# Patient Record
Sex: MALE | Race: WHITE | NOT HISPANIC OR LATINO
[De-identification: names, ages, dates, MRNs, and addresses within clinical notes are randomized per-mention and may not be internally consistent; named-entity substitution may affect disease eponyms.]

---

## 2023-01-04 ENCOUNTER — APPOINTMENT (OUTPATIENT)
Dept: ORTHOPEDIC SURGERY | Facility: CLINIC | Age: 48
End: 2023-01-04
Payer: COMMERCIAL

## 2023-01-04 ENCOUNTER — TRANSCRIPTION ENCOUNTER (OUTPATIENT)
Age: 48
End: 2023-01-04

## 2023-01-04 VITALS — WEIGHT: 189 LBS | BODY MASS INDEX: 28.64 KG/M2 | HEIGHT: 68 IN

## 2023-01-04 DIAGNOSIS — E11.9 TYPE 2 DIABETES MELLITUS W/OUT COMPLICATIONS: ICD-10-CM

## 2023-01-04 PROCEDURE — 99214 OFFICE O/P EST MOD 30 MIN: CPT | Mod: 25,57

## 2023-01-04 PROCEDURE — 73030 X-RAY EXAM OF SHOULDER: CPT | Mod: RT

## 2023-01-04 PROCEDURE — 20611 DRAIN/INJ JOINT/BURSA W/US: CPT | Mod: RT

## 2023-01-04 PROCEDURE — 99203 OFFICE O/P NEW LOW 30 MIN: CPT | Mod: 25

## 2023-01-04 NOTE — DISCUSSION/SUMMARY
[de-identified] : RUE\par + Neer, + Deleon\par Pain with resisted abdcution\par Painful arc\par pain and weakness with cuff testing, + Drop arm test\par +TTP LHBT, + Speed \par **Stiff with abdcution and IR\par \par 48 y/o male presenting to the office with right shoulder pain x 3-4 months that started while playing mens hardball. He reports he noticed discomfort and pain while attempting to throw the ball back to the pitcher. \par X-rays reviewed with the patient are non diagnostic.\par He reports anterior and lateral pain.\par He has also noticed stiffness with respect to internal rotation.\par The patient will begin supervised PT according to frozen shoulder protocol.\par The patient received a glenohumeral joint injection today.\par The patient received a prescription for an MRI to evaluate for RCT.\par He will follow up in 1-2 weeks to review the study.\par \par Based on the patient’s history and physical exam findings, I am concerned about the possibility of a full-thickness rotator cuff tear. The patient has pain and subjective weakness consistent with this diagnosis. Therefore, I recommend an MRI to evaluate for a rotator cuff tear. This will also aid in evaluating for injury to the biceps labral complex and for any signs of impingement. The patient will follow-up after MRI to discuss further treatment options.\par \par (1) We discussed a comprehensive treatment plans that included a prescription management plan involving the use of prescription strength medications to include Ibuprofen 600- 800 mg TID, versus 500- 650 mg Tylenol. We also discussed prescribing topical diclofenac (Voltaren gel) as well as once daily Meloxicam 15 mg.\par \par (2) The patient has More Than One chronic injuries/illnesses as outlined, discussed, and documented by ICD 10 codes listed, as well as the HPI and Plan section.\par \par There is a moderate risk of morbidity with further treatment, especially from use of prescription strength medications and possible side effects of these medications which include upset stomach and cardiac/renal issues with long term use were discussed.\par \par (3) I recommended that the patient follow-up with their medical physician to discuss any significant specific potential issues with long term use such as interactions with current medications or with exacerbation of underlying medical morbidities.\par \par Attestation:\par \par I, Vianca Martinezq, attest that this documentation has been prepared under the direction and in the presence of Provider Markie Curry MD.\par \par The documentation recorded by the scribe, in my presence, accurately reflects the service I personally performed, and the decisions made by me with my edits as appropriate.\par \par Markie Curry MD\par

## 2023-01-04 NOTE — PHYSICAL EXAM
[Right] : right shoulder [There are no fractures, subluxations or dislocations. No significant abnormalities are seen] : There are no fractures, subluxations or dislocations. No significant abnormalities are seen [de-identified] : Constitutional: The general appearance of the patient is well developed, well nourished, no deformities and well groomed. Normal \par \par Gait: Gait and function is as follows: normal gait. \par \par Skin: Head and neck visualized skin is normal. Left upper extremity visualized skin is normal. Right upper extremity visualized skin is normal. Thoracic Skin of the thoracic spine shows visualized skin is normal. \par \par Cardiovascular: palpable radial pulse bilaterally, good capillary refill in digits of the bilateral upper extremities and no temperature or color changes in the bilateral upper extremities. \par \par Lymphatic: Normal Palpation of lymph nodes in the cervical. \par \par Neurologic: fine motor control in the bilateral upper extremities is intact. Deep Tendon Reflexes in Upper and Lower Extremities Negative Young's in the bilateral upper extremities. The patient is oriented to time, place and person. Sensation to light touch intact in the bilateral upper extremities. Mood and Affect is normal. \par \par Right Shoulder: Inspection of the shoulder/upper arm is as follows: Stable shoulder. Palpation of the shoulder/upper arm is as follows: There is tenderness at the AC joint, proximal biceps tendon and supraspinatus tendon. Range of motion of the shoulder is as follows: Pain with internal rotation, external rotation, abduction and forward flexion. Strength of the shoulder is as follows: Supraspinatus 4/5. External Rotation 4/5. Internal Rotation 4/5. Infraspinatus 5/5 4/5. Deltoid 5/5 Ligament Stability and Special Tests of the shoulder is as follows: Neer test is positive. Deleon' test is positive. Speed's test is positive. \par \par Left Shoulder: Inspection of the shoulder/upper arm is as follows: There is a full, pain-free, stable range of motion of the shoulder with normal strength and no tenderness to palpation. \par \par Neck: Inspection / Palpation of the cervical spine is as follows: There is a mild restricted range of motion of the cervical spine. Increase tone and mild tenderness to palpation. Stable ROM of cervical spine. \par \par Back, including spine: Inspection / Palpation of the thoracic/lumbar spine is as follows: There is a full, pain free, stable range of motion of the thoracic spine with a normal tone and not tenderness to palpation.. \par \par

## 2023-01-04 NOTE — PROCEDURE
[FreeTextEntry3] : Large Joint Injection was performed because of pain and inflammation.\par \par Anesthesia: ethyl chloride sprayed topically..\par \par Depomedrol: An injection of Depomedrol 40 mg , 1 cc.\par \par Lidocaine: 3 cc.\par \par Medication was injected in the right glenohumeral. Patient has tried OTC's including aspirin, Ibuprofen, Aleve etc or prescription NSAIDS, and/or exercises at home and/ or physical therapy without satisfactory response and Patient has decreased mobility in the joint. After verbal consent using sterile preparation and technique. The risks, benefits, and alternatives to cortisone injection were explained in full to the patient. Risks outlined include but are not limited to infection, sepsis, bleeding, scarring, skin discoloration, temporary increase in pain, syncopal episode, failure to resolve symptoms, allergic reaction, symptom recurrence, and elevation of blood sugar in diabetics. Patient understood the risks. All questions were answered. After discussion of options, patient requested an injection. Oral informed consent was obtained and sterile prep was done of the injection site. Sterile technique was utilized for the procedure including the preparation of the solutions used for the injection. Patient tolerated the procedure well. Advised to ice the injection site this evening. Prep with alcohol locally to site. Sterile technique used. Patient tolerated procedure well. Post Procedure Instructions: Patient was advised to call if redness, pain, or fever occur and apply ice for 15 min. out of every hour for the next 12-24 hours as tolerated. patient was advised to rest the joint(s) for 7 days.\par \par Ultrasound Guidance was used for the following reasons: prior failure or difficult injection.\par \par Ultrasound guided injection was performed of the shoulder, visualization of the needle and placement of injection was performed without complication.\par \par

## 2023-01-04 NOTE — HISTORY OF PRESENT ILLNESS
[de-identified] : 48 y/o male presenting to the office with right shoulder pain x 3-4 months that started while playing mens hardball. He reports he noticed discomfort and pain while attempting to throw the ball back to the pitcher. Pain has been getting progressively worse. He admits to anterior and lateral pain. He has noticed stiffness with respect to internal rotation. He has been under the care of a physical therapy and refers today for further evaluation. Pain is constant, moderate. Denies any numbness or tingling. \par Patient is RHD, PMHx DMI (A1C ~7-8)

## 2023-01-09 ENCOUNTER — APPOINTMENT (OUTPATIENT)
Dept: MRI IMAGING | Facility: CLINIC | Age: 48
End: 2023-01-09

## 2023-01-10 ENCOUNTER — APPOINTMENT (OUTPATIENT)
Dept: MRI IMAGING | Facility: CLINIC | Age: 48
End: 2023-01-10

## 2023-01-25 ENCOUNTER — APPOINTMENT (OUTPATIENT)
Dept: ORTHOPEDIC SURGERY | Facility: CLINIC | Age: 48
End: 2023-01-25
Payer: COMMERCIAL

## 2023-01-25 PROCEDURE — 99214 OFFICE O/P EST MOD 30 MIN: CPT

## 2023-01-25 NOTE — HISTORY OF PRESENT ILLNESS
[de-identified] : 46 y/o male presenting to the office with right shoulder pain x 3-4 months that started while playing mens hardball. He reports he noticed discomfort and pain while attempting to throw the ball back to the pitcher. Pain has been getting progressively worse. He admits to anterior and lateral pain. He has noticed stiffness with respect to internal rotation. He has been under the care of a physical therapy and refers today for further evaluation. Pain is constant, moderate. Denies any numbness or tingling. \par Patient is RHD, PMHx DMI (A1C ~7-8)\par 1/25/23: Patient presents today for follow up of right shoulder pain x 5 months. He also reports new traumatic fall while skiing over a week ago. He was previously treated with US guided GH injection which provided moderate relief and slightly improved motion. His previous MRI request was denied.

## 2023-01-25 NOTE — PHYSICAL EXAM
[Right] : right shoulder [There are no fractures, subluxations or dislocations. No significant abnormalities are seen] : There are no fractures, subluxations or dislocations. No significant abnormalities are seen [de-identified] : Constitutional: The general appearance of the patient is well developed, well nourished, no deformities and well groomed. Normal \par \par Gait: Gait and function is as follows: normal gait. \par \par Skin: Head and neck visualized skin is normal. Left upper extremity visualized skin is normal. Right upper extremity visualized skin is normal. Thoracic Skin of the thoracic spine shows visualized skin is normal. \par \par Cardiovascular: palpable radial pulse bilaterally, good capillary refill in digits of the bilateral upper extremities and no temperature or color changes in the bilateral upper extremities. \par \par Lymphatic: Normal Palpation of lymph nodes in the cervical. \par \par Neurologic: fine motor control in the bilateral upper extremities is intact. Deep Tendon Reflexes in Upper and Lower Extremities Negative Young's in the bilateral upper extremities. The patient is oriented to time, place and person. Sensation to light touch intact in the bilateral upper extremities. Mood and Affect is normal. \par \par Right Shoulder: Inspection of the shoulder/upper arm is as follows: Stable shoulder. Palpation of the shoulder/upper arm is as follows: There is tenderness at the AC joint, proximal biceps tendon and supraspinatus tendon. Range of motion of the shoulder is as follows: Pain with internal rotation, external rotation, abduction and forward flexion. Strength of the shoulder is as follows: Supraspinatus 4/5. External Rotation 4/5. Internal Rotation 4/5. Infraspinatus 5/5 4/5. Deltoid 5/5 Ligament Stability and Special Tests of the shoulder is as follows: Neer test is positive. Deleon' test is positive. Speed's test is positive. \par \par Left Shoulder: Inspection of the shoulder/upper arm is as follows: There is a full, pain-free, stable range of motion of the shoulder with normal strength and no tenderness to palpation. \par \par Neck: Inspection / Palpation of the cervical spine is as follows: There is a mild restricted range of motion of the cervical spine. Increase tone and mild tenderness to palpation. Stable ROM of cervical spine. \par \par Back, including spine: Inspection / Palpation of the thoracic/lumbar spine is as follows: There is a full, pain free, stable range of motion of the thoracic spine with a normal tone and not tenderness to palpation.. \par \par

## 2023-01-25 NOTE — DISCUSSION/SUMMARY
[de-identified] : RUE AFF 0-130, Abduction 0-90\par ER 3/5\par \par 46 y/o male presenting to the office with right shoulder pain x 3-4 months that started while playing mens hardball. \par He was treated several weeks ago with US guided GH injection to help alleviate stiffness. \par Patient has noticed improvement with respect to ROM but continues to report moderate weakness. \par At this point he has completed more than 6 weeks of recent conservative therapy including PT/HEP, activity modification and tylenol.\par \par However in addition he reports new traumatic fall while skiing over a week ago and has noticed worsening pain.\par At this point we are recommending MRI to evaluate for rotator cuff tear. \par Patient will contact his endocrinologist to see if he is a candidate for MDP. \par He will continue with HEP at this point. \par \par He will follow up in 1-2 weeks to review the study.\par \par Based on the patient’s history and physical exam findings, I am concerned about the possibility of a full-thickness rotator cuff tear. The patient has pain and subjective weakness consistent with this diagnosis. Therefore, I recommend an MRI to evaluate for a rotator cuff tear. This will also aid in evaluating for injury to the biceps labral complex and for any signs of impingement. The patient will follow-up after MRI to discuss further treatment options.\par \par (1) We discussed a comprehensive treatment plans that included a prescription management plan involving the use of prescription strength medications to include Ibuprofen 600- 800 mg TID, versus 500- 650 mg Tylenol. We also discussed prescribing topical diclofenac (Voltaren gel) as well as once daily Meloxicam 15 mg.\par \par (2) The patient has More Than One chronic injuries/illnesses as outlined, discussed, and documented by ICD 10 codes listed, as well as the HPI and Plan section.\par \par There is a moderate risk of morbidity with further treatment, especially from use of prescription strength medications and possible side effects of these medications which include upset stomach and cardiac/renal issues with long term use were discussed.\par \par (3) I recommended that the patient follow-up with their medical physician to discuss any significant specific potential issues with long term use such as interactions with current medications or with exacerbation of underlying medical morbidities.\par \par Attestation:\par \par I, Ammen Adria, attest that this documentation has been prepared under the direction and in the presence of Provider Markie Curry MD.\par \par The documentation recorded by the scribe, in my presence, accurately reflects the service I personally performed, and the decisions made by me with my edits as appropriate.\par \par Markie Curry MD\par

## 2023-02-12 ENCOUNTER — FORM ENCOUNTER (OUTPATIENT)
Age: 48
End: 2023-02-12

## 2023-02-13 ENCOUNTER — APPOINTMENT (OUTPATIENT)
Dept: MRI IMAGING | Facility: CLINIC | Age: 48
End: 2023-02-13
Payer: COMMERCIAL

## 2023-02-13 PROCEDURE — 73221 MRI JOINT UPR EXTREM W/O DYE: CPT | Mod: RT

## 2023-02-15 ENCOUNTER — APPOINTMENT (OUTPATIENT)
Dept: ORTHOPEDIC SURGERY | Facility: CLINIC | Age: 48
End: 2023-02-15
Payer: COMMERCIAL

## 2023-02-15 PROCEDURE — 99214 OFFICE O/P EST MOD 30 MIN: CPT

## 2023-02-15 RX ORDER — MELOXICAM 15 MG/1
15 TABLET ORAL
Qty: 30 | Refills: 1 | Status: ACTIVE | COMMUNITY
Start: 2023-02-15 | End: 1900-01-01

## 2023-02-15 RX ORDER — METHYLPREDNISOLONE 4 MG/1
4 TABLET ORAL
Qty: 1 | Refills: 0 | Status: ACTIVE | COMMUNITY
Start: 2023-02-15 | End: 1900-01-01

## 2023-02-15 NOTE — DISCUSSION/SUMMARY
[de-identified] : RUE AFF 0-130, Abduction 0-90\par ER 5/5\par \par 48 y/o male presenting to the office with right shoulder pain x 3-4 months that started while playing mens hardball. \par He was treated several weeks ago with US guided GH injection to help alleviate stiffness. \par Patient has noticed improvement with respect to ROM but continues to report moderate weakness. \par \par MRI scan reviewed with the patient demonstrates severe adhesive capsulitis with diffuse effusion and synovitis along with superior labral tearing in the glenohumeral joint. Mild diffuse rotator cuff tendinopathy, moderate biceps tenosynovitis, and AC joint arthrosis.\par \par Patient was prescribed MDP and Mobic for pain relief (patient will discuss with his endocrinologist and his primary care if he can take these medications due to possible interaction.)\par \par Patient received supervised PT prescription to follow according to protocol.\par He will follow up in 6 weeks.\par \par If the patients pain is not improved we could consider surgical treatment options.\par \par (1) We discussed a comprehensive treatment plans that included a prescription management plan involving the use of prescription strength medications to include Ibuprofen 600- 800 mg TID, versus 500- 650 mg Tylenol. We also discussed prescribing topical diclofenac (Voltaren gel) as well as once daily Meloxicam 15 mg.\par \par (2) The patient has More Than One chronic injuries/illnesses as outlined, discussed, and documented by ICD 10 codes listed, as well as the HPI and Plan section.\par \par There is a moderate risk of morbidity with further treatment, especially from use of prescription strength medications and possible side effects of these medications which include upset stomach and cardiac/renal issues with long term use were discussed.\par \par (3) I recommended that the patient follow-up with their medical physician to discuss any significant specific potential issues with long term use such as interactions with current medications or with exacerbation of underlying medical morbidities.\par \par Attestation:\par \par I, Vianca Covington, attest that this documentation has been prepared under the direction and in the presence of Provider Markie Curry MD.\par \par The documentation recorded by the scribe, in my presence, accurately reflects the service I personally performed, and the decisions made by me with my edits as appropriate.\par \par Markie Curry MD\par

## 2023-02-15 NOTE — DATA REVIEWED
[FreeTextEntry1] : MRI right shoulder O&C 2/13/23:\par \par 1. Severe adhesive capsulitis with diffuse effusion and synovitis along with superior labral tearing in the \par glenohumeral joint.\par 2. Mild diffuse rotator cuff tendinopathy, moderate biceps tenosynovitis, and AC joint arthrosis.\par 3. No acute fracture or disproportionate muscle atrophy.

## 2023-02-15 NOTE — HISTORY OF PRESENT ILLNESS
[de-identified] : 48 y/o male presenting to the office with right shoulder pain x 3-4 months that started while playing mens hardball. He reports he noticed discomfort and pain while attempting to throw the ball back to the pitcher. Pain has been getting progressively worse. He admits to anterior and lateral pain. He has noticed stiffness with respect to internal rotation. He has been under the care of a physical therapy and refers today for further evaluation. Pain is constant, moderate. Denies any numbness or tingling. \par Patient is RHD, PMHx DMI (A1C ~7-8)\par 1/25/23: Patient presents today for follow up of right shoulder pain x 5 months. He also reports new traumatic fall while skiing over a week ago. He was previously treated with US guided GH injection which provided moderate relief and slightly improved motion. His previous MRI request was denied. \par 2/15/23: Patient presenting with MRI of the right shoulder for review. He continues to have pain in the shoulder.

## 2023-02-15 NOTE — PHYSICAL EXAM
[de-identified] : Constitutional: The general appearance of the patient is well developed, well nourished, no deformities and well groomed. Normal \par \par Gait: Gait and function is as follows: normal gait. \par \par Skin: Head and neck visualized skin is normal. Left upper extremity visualized skin is normal. Right upper extremity visualized skin is normal. Thoracic Skin of the thoracic spine shows visualized skin is normal. \par \par Cardiovascular: palpable radial pulse bilaterally, good capillary refill in digits of the bilateral upper extremities and no temperature or color changes in the bilateral upper extremities. \par \par Lymphatic: Normal Palpation of lymph nodes in the cervical. \par \par Neurologic: fine motor control in the bilateral upper extremities is intact. Deep Tendon Reflexes in Upper and Lower Extremities Negative Young's in the bilateral upper extremities. The patient is oriented to time, place and person. Sensation to light touch intact in the bilateral upper extremities. Mood and Affect is normal. \par \par Right Shoulder: Inspection of the shoulder/upper arm is as follows: Stable shoulder. Palpation of the shoulder/upper arm is as follows: There is tenderness at the AC joint, proximal biceps tendon and supraspinatus tendon. Range of motion of the shoulder is as follows: Pain with internal rotation, external rotation, abduction and forward flexion. Strength of the shoulder is as follows: Supraspinatus 4/5. External Rotation 4/5. Internal Rotation 4/5. Infraspinatus 5/5 4/5. Deltoid 5/5 Ligament Stability and Special Tests of the shoulder is as follows: Neer test is positive. Deleon' test is positive. Speed's test is positive. \par \par Left Shoulder: Inspection of the shoulder/upper arm is as follows: There is a full, pain-free, stable range of motion of the shoulder with normal strength and no tenderness to palpation. \par \par Neck: Inspection / Palpation of the cervical spine is as follows: There is a mild restricted range of motion of the cervical spine. Increase tone and mild tenderness to palpation. Stable ROM of cervical spine. \par \par Back, including spine: Inspection / Palpation of the thoracic/lumbar spine is as follows: There is a full, pain free, stable range of motion of the thoracic spine with a normal tone and not tenderness to palpation.. \par \par

## 2023-04-19 ENCOUNTER — APPOINTMENT (OUTPATIENT)
Dept: ORTHOPEDIC SURGERY | Facility: CLINIC | Age: 48
End: 2023-04-19
Payer: COMMERCIAL

## 2023-04-19 PROCEDURE — 99214 OFFICE O/P EST MOD 30 MIN: CPT

## 2023-04-19 RX ORDER — MELOXICAM 15 MG/1
15 TABLET ORAL
Qty: 30 | Refills: 2 | Status: ACTIVE | COMMUNITY
Start: 2023-04-19 | End: 1900-01-01

## 2023-08-25 NOTE — HISTORY OF PRESENT ILLNESS
[de-identified] : 48 y/o male presenting to the office with right shoulder pain x 3-4 months that started while playing mens hardball. He reports he noticed discomfort and pain while attempting to throw the ball back to the pitcher. Pain has been getting progressively worse. He admits to anterior and lateral pain. He has noticed stiffness with respect to internal rotation. He has been under the care of a physical therapy and refers today for further evaluation. Pain is constant, moderate. Denies any numbness or tingling. \par Patient is RHD, PMHx DMI (A1C ~7-8)\par 1/25/23: Patient presents today for follow up of right shoulder pain x 5 months. He also reports new traumatic fall while skiing over a week ago. He was previously treated with US guided GH injection which provided moderate relief and slightly improved motion. His previous MRI request was denied. \par 2/15/23: Patient presenting with MRI of the right shoulder for review. He continues to have pain in the shoulder. \par \par 4/19/23 Pt presents for follow up of right shoulder.  Patient has some improvement but is still having limited range of motion.  He has adhesive capsulitis.

## 2023-08-25 NOTE — PHYSICAL EXAM
[de-identified] : Constitutional: The general appearance of the patient is well developed, well nourished, no deformities and well groomed. Normal \par \par Gait: Gait and function is as follows: normal gait. \par \par Skin: Head and neck visualized skin is normal. Left upper extremity visualized skin is normal. Right upper extremity visualized skin is normal. Thoracic Skin of the thoracic spine shows visualized skin is normal. \par \par Cardiovascular: palpable radial pulse bilaterally, good capillary refill in digits of the bilateral upper extremities and no temperature or color changes in the bilateral upper extremities. \par \par Lymphatic: Normal Palpation of lymph nodes in the cervical. \par \par Neurologic: fine motor control in the bilateral upper extremities is intact. Deep Tendon Reflexes in Upper and Lower Extremities Negative Young's in the bilateral upper extremities. The patient is oriented to time, place and person. Sensation to light touch intact in the bilateral upper extremities. Mood and Affect is normal. \par \par Right Shoulder: Inspection of the shoulder/upper arm is as follows: Stable shoulder. Palpation of the shoulder/upper arm is as follows: There is tenderness at the AC joint, proximal biceps tendon and supraspinatus tendon. Range of motion of the shoulder is as follows: Pain with internal rotation, external rotation, abduction and forward flexion. Strength of the shoulder is as follows: Supraspinatus 4/5. External Rotation 4/5. Internal Rotation 4/5. Infraspinatus 5/5 4/5. Deltoid 5/5 Ligament Stability and Special Tests of the shoulder is as follows: Neer test is positive. Deleon' test is positive. Speed's test is positive. \par \par Left Shoulder: Inspection of the shoulder/upper arm is as follows: There is a full, pain-free, stable range of motion of the shoulder with normal strength and no tenderness to palpation. \par \par Neck: Inspection / Palpation of the cervical spine is as follows: There is a mild restricted range of motion of the cervical spine. Increase tone and mild tenderness to palpation. Stable ROM of cervical spine. \par \par Back, including spine: Inspection / Palpation of the thoracic/lumbar spine is as follows: There is a full, pain free, stable range of motion of the thoracic spine with a normal tone and not tenderness to palpation.. \par \par

## 2023-08-25 NOTE — DISCUSSION/SUMMARY
[de-identified] : RUE AFF 0-130, Abduction 0-90 ER 5/5  46 y/o male presenting to the office with right shoulder pain that started while playing mens hardball.  He was treated several weeks ago with US guided GH injection to help alleviate stiffness.  Patient has noticed improvement with respect to ROM but continues to report moderate weakness.   Previous MRI demonstrates severe adhesive capsulitis with diffuse effusion and synovitis along with superior labral tearing in the glenohumeral joint. Mild diffuse rotator cuff tendinopathy, moderate biceps tenosynovitis, and AC joint arthrosis.  He can continue HEP and home stretches.  Patient will call if he is interested in proceeding with surgical treatments over the next month or so. If the patients pain is not improved we could consider surgical treatment options.  Surgery would be right arthroscopic capsulectomy with lysis of adhesions and probable mini open biceps tenodesis.  addendum 8/25/23: Patient continues to have significantly limited range of motion and difficulty. He is interested in definitive surgical management. Pt has a significant injury to the biceps-labral complex and continues to report pain and weakness despite more than 3 months of conservative treatment including focused HEP/therapy, injections, anti-inflammatory medication and activity modification. The patient is interested in pursuing surgical treatment. We had a lengthy discussion about the surgery including the likelihood of addressing other pathology with arthroscopic vs mini-open biceps tenodesis, subacromial decompression, and extensive debridement of any pathologic tissue encountered at the time of surgery.  I discussed the risks and benefits of both operative and non-operative treatment. I explained in detail the postoperative recovery including the duration of sling use and expectation for postoperative physical therapy. Explained that there is no guarantee however there is a high likelihood of functional improvement and pain relief. The patient understood all this was discussed.  The patient is indicated for a Right shoulder arthroscopy possible rotator cuff repair, biceps tenodesis, and subacromial decompression.  * Surgery: Considering patient has failed all conservative treatment we are requesting authorization for: Right shoulder arthroscopic debridement with capsulectomy (CPT 22823), mini open biceps (CPT 30591 ), Subacromial decompression (71100). Pt would like surgery (as soon as possible) The patient will require a [place] 2.0 brace, cryotherapy, and 12 weeks of post-operative physical therapy. The patient will require a medical clearance  The doctor will require the [employer] representative, one hour, and one assistant.   (1) We discussed a comprehensive treatment plans that included a prescription management plan involving the use of prescription strength medications to include Ibuprofen 600- 800 mg TID, versus 500- 650 mg Tylenol. We also discussed prescribing topical diclofenac (Voltaren gel) as well as once daily Meloxicam 15 mg.  (2) The patient has More Than One chronic injuries/illnesses as outlined, discussed, and documented by ICD 10 codes listed, as well as the HPI and Plan section.  There is a moderate risk of morbidity with further treatment, especially from use of prescription strength medications and possible side effects of these medications which include upset stomach and cardiac/renal issues with long term use were discussed.  (3) I recommended that the patient follow-up with their medical physician to discuss any significant specific potential issues with long term use such as interactions with current medications or with exacerbation of underlying medical morbidities.  Attestation:  I, Luz Maria Olivares, attest that this documentation has been prepared under the direction and in the presence of Provider Markie Curry MD.  The documentation recorded by the scribe, in my presence, accurately reflects the service I personally performed, and the decisions made by me with my edits as appropriate.  Markie Curry MD

## 2023-10-25 ENCOUNTER — APPOINTMENT (OUTPATIENT)
Dept: ORTHOPEDIC SURGERY | Facility: CLINIC | Age: 48
End: 2023-10-25

## 2023-11-01 ENCOUNTER — APPOINTMENT (OUTPATIENT)
Dept: ORTHOPEDIC SURGERY | Facility: CLINIC | Age: 48
End: 2023-11-01
Payer: COMMERCIAL

## 2023-11-01 PROCEDURE — 99213 OFFICE O/P EST LOW 20 MIN: CPT

## 2023-11-01 RX ORDER — TRAMADOL HYDROCHLORIDE 50 MG/1
50 TABLET, COATED ORAL
Qty: 30 | Refills: 0 | Status: ACTIVE | COMMUNITY
Start: 2023-11-01 | End: 1900-01-01

## 2023-11-09 ENCOUNTER — APPOINTMENT (OUTPATIENT)
Dept: ORTHOPEDIC SURGERY | Facility: HOSPITAL | Age: 48
End: 2023-11-09
Payer: COMMERCIAL

## 2023-11-09 ENCOUNTER — RESULT REVIEW (OUTPATIENT)
Age: 48
End: 2023-11-09

## 2023-11-09 PROCEDURE — 29823 SHO ARTHRS SRG XTNSV DBRDMT: CPT | Mod: 59,RT

## 2023-11-09 PROCEDURE — 29826 SHO ARTHRS SRG DECOMPRESSION: CPT | Mod: AS,RT

## 2023-11-09 PROCEDURE — 29826 SHO ARTHRS SRG DECOMPRESSION: CPT | Mod: RT

## 2023-11-09 PROCEDURE — 29823 SHO ARTHRS SRG XTNSV DBRDMT: CPT | Mod: AS,59,RT

## 2023-11-09 PROCEDURE — 23430 REPAIR BICEPS TENDON: CPT | Mod: RT

## 2023-11-09 PROCEDURE — 29821 SHO ARTHRS SRG COMPL SYNVCT: CPT | Mod: AS,59,RT

## 2023-11-09 PROCEDURE — 23430 REPAIR BICEPS TENDON: CPT | Mod: AS,RT

## 2023-11-09 PROCEDURE — 29821 SHO ARTHRS SRG COMPL SYNVCT: CPT | Mod: 59,RT

## 2023-11-22 ENCOUNTER — APPOINTMENT (OUTPATIENT)
Dept: ORTHOPEDIC SURGERY | Facility: CLINIC | Age: 48
End: 2023-11-22
Payer: COMMERCIAL

## 2023-11-22 PROCEDURE — 99024 POSTOP FOLLOW-UP VISIT: CPT

## 2024-05-08 ENCOUNTER — APPOINTMENT (OUTPATIENT)
Dept: ORTHOPEDIC SURGERY | Facility: CLINIC | Age: 49
End: 2024-05-08
Payer: COMMERCIAL

## 2024-05-08 DIAGNOSIS — M75.51 BURSITIS OF RIGHT SHOULDER: ICD-10-CM

## 2024-05-08 DIAGNOSIS — M25.511 PAIN IN RIGHT SHOULDER: ICD-10-CM

## 2024-05-08 DIAGNOSIS — S43.431A SUPERIOR GLENOID LABRUM LESION OF RIGHT SHOULDER, INITIAL ENCOUNTER: ICD-10-CM

## 2024-05-08 DIAGNOSIS — M75.01 ADHESIVE CAPSULITIS OF RIGHT SHOULDER: ICD-10-CM

## 2024-05-08 PROCEDURE — 99213 OFFICE O/P EST LOW 20 MIN: CPT

## 2024-05-08 NOTE — HISTORY OF PRESENT ILLNESS
[de-identified] : 46 y/o male presenting to the office with right shoulder pain x 3-4 months that started while playing mens hardball. He reports he noticed discomfort and pain while attempting to throw the ball back to the pitcher. Pain has been getting progressively worse. He admits to anterior and lateral pain. He has noticed stiffness with respect to internal rotation. He has been under the care of a physical therapy and refers today for further evaluation. Pain is constant, moderate. Denies any numbness or tingling.  Patient is RHD, PMHx DMI (A1C ~7-8) 1/25/23: Patient presents today for follow up of right shoulder pain x 5 months. He also reports new traumatic fall while skiing over a week ago. He was previously treated with US guided GH injection which provided moderate relief and slightly improved motion. His previous MRI request was denied.  2/15/23: Patient presenting with MRI of the right shoulder for review. He continues to have pain in the shoulder.   4/19/23 Pt presents for follow up of right shoulder.  Patient has some improvement but is still having limited range of motion.  He has adhesive capsulitis. 11/1/23: Patient returns today for repeat evaluation of ongoing right shoulder pain and stiffness.  He has failed all conservative management.  Patient had initial improvement after glenohumeral injection with pain has since returned.  Wishes to discuss surgery. 11/22/23: Patient presents 2 weeks s/p righ arthroscopic capsulectomy, rotator cuff debridement, SAD and biceps tenodesis. Patient is doing well, pain is controlled.  Patient has started to wean from sling. ROM improving. Denies any fever, chills, drainage. 5/8/24: Patient presents 6 months s/p right arthroscopic capsulectomy, rotator cuff debridement, SAD and biceps tenodesis. Patient has residual stiffness with internal rotation

## 2024-05-08 NOTE — DISCUSSION/SUMMARY
[de-identified] : This is a 47 yo male 6 months s/p right arthroscopic capsulectomy, rotator cuff debridement, subacromial decompression and mini open biceps tenodesis.  patient is doing well. Patient has residual stiffness with forward flexion Supine FF shown to maximize range of motion Sleeper stretch shown to maximize internal rotation  Door hangs demonstrated Physical therapy script given Follow up as needed    (1) We discussed a comprehensive treatment plans that included a prescription management plan involving the use of prescription strength medications to include Ibuprofen 600-800 mg TID, versus 500-650 mg Tylenol. We also discussed prescribing topical diclofenac (Voltaren gel) as well as once daily Meloxicam 15 mg. (2) The patient has More Than One chronic injuries/illnesses as outlined, discussed, and documented by ICD 10 codes listed, as well as the HPI and Plan section. There is a moderate risk of morbidity with further treatment, especially from use of prescription strength medications and possible side effects of these medications which include upset stomach and cardiac/renal issues with long term use were discussed. (3) I recommended that the patient follow-up with their medical physician to discuss any significant specific potential issues with long term use such as interactions with current medications or with exacerbation of underlying medical morbidities.   Attestation: I, Alyssia ELKINS'Erin , attest that this documentation has been prepared under the direction and in the presence of Provider Markie Curry MD. The documentation recorded by the scribe, in my presence, accurately reflects the service I personally performed, and the decisions made by me with my edits as appropriate. Markie Curry MD

## 2024-05-08 NOTE — PHYSICAL EXAM
[de-identified] : Constitutional: The general appearance of the patient is well developed, well nourished, no deformities and well groomed. Normal   Gait: Gait and function is as follows: normal gait.   Skin: Head and neck visualized skin is normal. Left upper extremity visualized skin is normal. Right upper extremity visualized skin is normal. Thoracic Skin of the thoracic spine shows visualized skin is normal.   Cardiovascular: palpable radial pulse bilaterally, good capillary refill in digits of the bilateral upper extremities and no temperature or color changes in the bilateral upper extremities.   Lymphatic: Normal Palpation of lymph nodes in the cervical.   Neurologic: fine motor control in the bilateral upper extremities is intact. Deep Tendon Reflexes in Upper and Lower Extremities Negative Young's in the bilateral upper extremities. The patient is oriented to time, place and person. Sensation to light touch intact in the bilateral upper extremities. Mood and Affect is normal.   Right Shoulder:  Inspection of the shoulder/upper arm is as follows: Stable shoulder. Palpation of the shoulder/upper arm is as follows: There is mild diffuse tenderness . Range of motion of the shoulder is as follows: Limited secondary to immobilization/surgery. Strength of the shoulder is as follows:  Deltoid 5/5 Ligament Stability and Special Tests of the shoulder is as follows: Stable shoulder Incisions benign, no erythema/ swelling.   Left Shoulder: Inspection of the shoulder/upper arm is as follows: There is a full, pain-free, stable range of motion of the shoulder with normal strength and no tenderness to palpation.   Neck: Inspection / Palpation of the cervical spine is as follows: There is a mild restricted range of motion of the cervical spine. Increase tone and mild tenderness to palpation. Stable ROM of cervical spine.   Back, including spine: Inspection / Palpation of the thoracic/lumbar spine is as follows: There is a full, pain free, stable range of motion of the thoracic spine with a normal tone and not tenderness to palpation..